# Patient Record
Sex: FEMALE | Race: WHITE | NOT HISPANIC OR LATINO | Employment: FULL TIME | URBAN - METROPOLITAN AREA
[De-identification: names, ages, dates, MRNs, and addresses within clinical notes are randomized per-mention and may not be internally consistent; named-entity substitution may affect disease eponyms.]

---

## 2019-07-08 ENCOUNTER — HISTORICAL (OUTPATIENT)
Dept: ADMINISTRATIVE | Facility: HOSPITAL | Age: 54
End: 2019-07-08

## 2019-07-19 ENCOUNTER — HISTORICAL (OUTPATIENT)
Dept: RADIOLOGY | Facility: HOSPITAL | Age: 54
End: 2019-07-19

## 2022-04-10 ENCOUNTER — HISTORICAL (OUTPATIENT)
Dept: ADMINISTRATIVE | Facility: HOSPITAL | Age: 57
End: 2022-04-10
Payer: COMMERCIAL

## 2022-04-27 VITALS
HEIGHT: 67 IN | SYSTOLIC BLOOD PRESSURE: 132 MMHG | DIASTOLIC BLOOD PRESSURE: 82 MMHG | WEIGHT: 227 LBS | BODY MASS INDEX: 35.63 KG/M2

## 2022-05-04 NOTE — HISTORICAL [facility] CERNER
This is a historical note converted from Kade. Formatting and pictures may have been removed.  Please reference Cerm[employer]am for original formatting and attached multimedia. Chief Complaint  Pt is here bilateral hip pain. Pt stated the pain has gotten worse.  History of Present Illness  Dilan is a 53-year-old female that presents office today for bilateral hip pain. ?This is been present for couple of years but?is worsened over the last couple months.? Her pain is localized to the lateral aspect of both hips. ?She denies any groin pain. ?She denies any back pain.? She states that this pain was noticeable after a total hysterectomy done in 2015.? She denies any?pain radiating down the leg or in the buttocks.? She has been seen by her PCP?diagnosed with some mild osteoarthritis?and osteopenia and sent to our office for further evaluation  Review of Systems  Systemic: No fever, no chills, and no recent weight change.  Head: No headache - frequent.  Eyes: No vision problems.  Otolarnygeal: No hearing loss, no earache, no epistaxis, no hoarseness, and no tooth pain. Gums normal.  Cardiovascular: No chest pain or discomfort and no palpitations.  Pulmonary: No pulmonary symptoms - no dyspnea, no shortness of breath  Gastrointestinal: Appetite not decreased. No dysphagia and no constant eructation. No nausea, no vomiting, no abdominal pain, no hematochezia.  Genitourinary: No genitourinary symptoms - No urinary hesitancy. No urinary loss of control - no burning sensation during urination.  Musculoskeletal: No calf muscle cramps and no localized soft tissue swelling  Neurological: No fainting and no convulsions.  Psychological: no depression.  Skin: No rash.  Physical Exam  Vitals & Measurements  T:?36.2? ?C (Oral)? HR:?70(Peripheral)? BP:?132/82?  HT:?170?cm? WT:?102.96?kg? BMI:?35.63?  bilateral hip exam  no swelling, erythema or increased heat  Tenderness to palpation over?greater trochanteric bursa of both hips  Full active  and passive range of motion both hips and flexion, internal and external rotation  No pain elicited with internal or external rotation  Full range of motion of both knees  Sensation intact distally  Pedal pulses 2+  ?  Radiographs of both hips taken office today show no signs of osteoarticular abnormalities  Bone density results were reviewed from 2015 showing?osteopenia  Assessment/Plan  1.?Bursitis of both hips?M70.71  ?Recommend physical therapy program with home exercises.? Recommend repeat bone density test as her last exam was done in 2015.? We will call her back with her test results and she will follow-up with us as needed.  ?  2.?Osteopenia?M85.80  ?Bone density test  Ordered:  [facility] Dexa Bone Density Study, Routine, *Est. 07/08/19 3:00:00 CDT, Osteoporosis, None, Stretcher, Patient Has IV?, Patient on Oxygen?, Rad Type, Order for future visit, Osteopenia, Schedule this test, [facility], *Est. 07/08/19 3:00:00 CDT  ?  Referrals  PT/OT External Referral, 07/08/19 9:57:00 CDT, Bursitis of both hips  Osteopenia, Anit Grav Hip Abductor & Extensor Exc.  Aquatics for ROM & Strength  Isotonic hamstring & Closed Chain Quad  No Dry Needling  Therapeutic Excercise  Stretch and Strengthen, 3 X Week, Patie...   Problem List/Past Medical History  Ongoing  Bursitis of both hips  Obesity  Osteopenia  Historical  Frequent UTI  Reflux  Procedure/Surgical History  Jejunostomy Laparoscopic (.) (07/10/2015)  Laparoscopy Exploratory (.) (07/10/2015)  Other enterostomy (07/10/2015)  Other procedures for creation of esophagogastric sphincteric competence (07/10/2015)  Suture of laceration of esophagus (07/10/2015)  Thoracotomy (.) (07/10/2015)  Hernia repair  Hysterectomy  Tonsillectomy   Medications  calcium (as calcium citrate) 250 mg oral tablet, 250 mg= 1 tab(s), Oral, BID  cranberry oral capsule  Duexis 800 mg-26.6 mg oral tablet, 1 tab(s), Oral, TID  Hair, Skin, & Nails Gummies, Chewed, Daily  hydroCHLOROthiazide  12.5 mg oral capsule, 12.5 mg= 1 cap(s), Oral, Daily  lansoprazole 15 mg oral DR capsule, 15 mg= 1 cap(s), Oral, BID  Lopreeza 0.5mg, 1 tab(s), Oral, Daily,? ?Not taking  Nexium 40 mg oral delayed release cap ([facility] Substitution), 40 mg, Oral, Daily,? ?Not taking  Probiotic + Colostrum, Oral, Daily  VESIcare 5 mg oral tablet, 5 mg= 1 tab(s), Oral, Daily,? ?Not taking  Vitamin C 25 mg oral tablet, chewable, 25 mg= 1 tab(s), Chewed, Daily  Vitamin D3  Xanax 0.5 mg oral tablet, 0.5 mg= 1 tab(s), Oral, Daily, PRN  Allergies  penicillins  Social History  Abuse/Neglect  No, 07/08/2019  Alcohol - Denies Alcohol Use, 07/10/2015  Substance Use - Denies Substance Abuse, 07/10/2015  Tobacco - Denies Tobacco Use, 07/10/2015  Former smoker, quit more than 30 days ago, N/A, 07/08/2019  Health Maintenance  Health Maintenance  ???Pending?(in the next year)  ??? ??OverDue  ??? ? ? ?Alcohol Misuse Screening due??01/01/19??and every 1??year(s)  ??? ??Due?  ??? ? ? ?ADL Screening due??07/08/19??and every 1??year(s)  ??? ? ? ?Breast Cancer Screening due??07/08/19??and every?  ??? ? ? ?Colorectal Screening due??07/08/19??and every?  ??? ? ? ?Diabetes Screening due??07/08/19??and every?  ??? ? ? ?Lipid Screening due??07/08/19??and every?  ??? ? ? ?Tetanus Vaccine due??07/08/19??and every 10??year(s)  ??? ??Due In Future?  ??? ? ? ?Obesity Screening not due until??01/01/20??and every 1??year(s)  ??? ? ? ?Blood Pressure Screening not due until??07/07/20??and every 1??year(s)  ??? ? ? ?Body Mass Index Check not due until??07/07/20??and every 1??year(s)  ??? ? ? ?Depression Screening not due until??07/07/20??and every 1??year(s)  ???Satisfied?(in the past 1 year)  ??? ??Satisfied?  ??? ? ? ?Blood Pressure Screening on??07/08/19.??Satisfied by Hilda Ulloa LPN  ??? ? ? ?Body Mass Index Check on??07/08/19.??Satisfied by Hilda Ulloa LPN  ??? ? ? ?Depression Screening on??07/08/19.??Satisfied by Hilda Ulloa LPN  ??? ? ? ?Obesity Screening  on??07/08/19.??Satisfied by Hilda Ulloa LPN  ?

## 2022-07-06 ENCOUNTER — OFFICE VISIT (OUTPATIENT)
Dept: SURGICAL ONCOLOGY | Facility: CLINIC | Age: 57
End: 2022-07-06
Payer: COMMERCIAL

## 2022-07-06 VITALS
BODY MASS INDEX: 32.18 KG/M2 | SYSTOLIC BLOOD PRESSURE: 153 MMHG | DIASTOLIC BLOOD PRESSURE: 89 MMHG | HEIGHT: 67 IN | HEART RATE: 81 BPM | WEIGHT: 205 LBS

## 2022-07-06 DIAGNOSIS — D13.4 HEPATIC ADENOMA: Primary | ICD-10-CM

## 2022-07-06 PROCEDURE — 3077F PR MOST RECENT SYSTOLIC BLOOD PRESSURE >= 140 MM HG: ICD-10-PCS | Mod: CPTII,S$GLB,, | Performed by: SURGERY

## 2022-07-06 PROCEDURE — 99213 PR OFFICE/OUTPT VISIT, EST, LEVL III, 20-29 MIN: ICD-10-PCS | Mod: S$GLB,,, | Performed by: SURGERY

## 2022-07-06 PROCEDURE — 3008F BODY MASS INDEX DOCD: CPT | Mod: CPTII,S$GLB,, | Performed by: SURGERY

## 2022-07-06 PROCEDURE — 1159F PR MEDICATION LIST DOCUMENTED IN MEDICAL RECORD: ICD-10-PCS | Mod: CPTII,S$GLB,, | Performed by: SURGERY

## 2022-07-06 PROCEDURE — 3079F DIAST BP 80-89 MM HG: CPT | Mod: CPTII,S$GLB,, | Performed by: SURGERY

## 2022-07-06 PROCEDURE — 3077F SYST BP >= 140 MM HG: CPT | Mod: CPTII,S$GLB,, | Performed by: SURGERY

## 2022-07-06 PROCEDURE — 3008F PR BODY MASS INDEX (BMI) DOCUMENTED: ICD-10-PCS | Mod: CPTII,S$GLB,, | Performed by: SURGERY

## 2022-07-06 PROCEDURE — 99999 PR PBB SHADOW E&M-EST. PATIENT-LVL III: CPT | Mod: PBBFAC,,, | Performed by: SURGERY

## 2022-07-06 PROCEDURE — 99999 PR PBB SHADOW E&M-EST. PATIENT-LVL III: ICD-10-PCS | Mod: PBBFAC,,, | Performed by: SURGERY

## 2022-07-06 PROCEDURE — 3079F PR MOST RECENT DIASTOLIC BLOOD PRESSURE 80-89 MM HG: ICD-10-PCS | Mod: CPTII,S$GLB,, | Performed by: SURGERY

## 2022-07-06 PROCEDURE — 1159F MED LIST DOCD IN RCRD: CPT | Mod: CPTII,S$GLB,, | Performed by: SURGERY

## 2022-07-06 PROCEDURE — 99213 OFFICE O/P EST LOW 20 MIN: CPT | Mod: S$GLB,,, | Performed by: SURGERY

## 2022-07-06 RX ORDER — ALPRAZOLAM 0.5 MG/1
TABLET ORAL
COMMUNITY
Start: 2022-04-29

## 2022-07-06 RX ORDER — HYDROCHLOROTHIAZIDE 12.5 MG/1
TABLET ORAL
COMMUNITY
Start: 2022-06-22

## 2022-07-06 NOTE — PROGRESS NOTES
Chief complaint:  Follow-up hepatic adenoma     HPI:  56-year-old female presents for follow-up of hepatic adenoma.  She has no complaints today.  No abdominal pain, early satiety or weight loss.  She remains off of any and all hormone replacement, including supplements.  Surveillance MRI was performed and I personally interpreted the images and report.  There remains a stable 1 cm fat containing lesion in hepatic segment 5 of the arterial phase enhancement most consistent hepatocellular adenoma.  This is unchanged for several years now.  I discussed these results with her today.        Past Medical and Surgical History  Allergies :   Penicillins    [facility]  Medical :   She has a past medical history of Bursitis of both hips, Hepatic adenoma, and Osteopenia.    Surgical :   She has a past surgical history that includes Tonsillectomy; Hysterectomy; Hernia repair; Laparoscopic insertion of jejunostomy tube; Robot-assisted exploratory laparoscopy; and Thoracotomy.     Family History  Her family history is not on file.    Social History  She      Review of Systems   Constitutional: Negative for appetite change, chills, diaphoresis and fever.   HENT: Negative for congestion, drooling, ear discharge, ear pain and hearing loss.    Eyes: Negative for discharge.   Respiratory: Negative for apnea, cough, choking, chest tightness, shortness of breath and stridor.    Cardiovascular: Negative for chest pain, palpitations and leg swelling.   Gastrointestinal: Negative for abdominal distention, abdominal pain, anal bleeding, blood in stool, constipation, diarrhea, nausea, rectal pain and vomiting.   Endocrine: Negative for cold intolerance, heat intolerance and polyuria.   Genitourinary: Negative for difficulty urinating, dyspareunia, dysuria and hematuria.   Musculoskeletal: Negative for arthralgias, gait problem and joint swelling.   Skin: Negative for color change and rash.   Neurological: Negative for dizziness, tremors,  seizures, syncope, facial asymmetry, speech difficulty, light-headedness, numbness and headaches.   Hematological: Negative for adenopathy. Does not bruise/bleed easily.   Psychiatric/Behavioral: Negative for agitation and confusion.        Objective   Physical Exam  Vitals and nursing note reviewed.   Constitutional:       General: She is not in acute distress.     Appearance: Normal appearance. She is not ill-appearing, toxic-appearing or diaphoretic.   HENT:      Head: Normocephalic and atraumatic.      Right Ear: External ear normal.      Left Ear: External ear normal.      Nose: Nose normal. No congestion or rhinorrhea.      Mouth/Throat:      Mouth: Mucous membranes are moist.      Pharynx: Oropharynx is clear. No oropharyngeal exudate or posterior oropharyngeal erythema.   Eyes:      General: No scleral icterus.     Extraocular Movements: Extraocular movements intact.      Conjunctiva/sclera: Conjunctivae normal.      Pupils: Pupils are equal, round, and reactive to light.   Cardiovascular:      Rate and Rhythm: Normal rate and regular rhythm.      Pulses: Normal pulses.      Heart sounds: No murmur heard.    No friction rub. No gallop.   Pulmonary:      Effort: Pulmonary effort is normal. No respiratory distress.      Breath sounds: Normal breath sounds. No stridor.   Abdominal:      General: Abdomen is flat. There is no distension.      Palpations: Abdomen is soft. There is no mass.      Tenderness: There is no abdominal tenderness. There is no right CVA tenderness, left CVA tenderness, guarding or rebound.      Hernia: No hernia is present.   Musculoskeletal:         General: No swelling, tenderness, deformity or signs of injury.      Cervical back: Normal range of motion and neck supple. No rigidity or tenderness.      Right lower leg: No edema.      Left lower leg: No edema.   Lymphadenopathy:      Cervical: No cervical adenopathy.   Skin:     General: Skin is warm.      Capillary Refill: Capillary  "refill takes less than 2 seconds.      Coloration: Skin is not jaundiced or pale.      Findings: No bruising, erythema, lesion or rash.   Neurological:      General: No focal deficit present.      Mental Status: She is alert and oriented to person, place, and time. Mental status is at baseline.      Cranial Nerves: No cranial nerve deficit.      Sensory: No sensory deficit.      Motor: No weakness.      Coordination: Coordination normal.      Gait: Gait normal.   Psychiatric:         Mood and Affect: Mood normal.         Behavior: Behavior normal.         Thought Content: Thought content normal.         Judgment: Judgment normal.       VITAL SIGNS: 24 HR MIN & MAX LAST    @FLOWSTAT(6:24::1)@           @FLOWSTAT(5:24::1)@  (!) 153/89     @FLOWSTAT(8:24::1)@  81     @FLOWSTAT(9:24::1)@       @FLOWSTAT(10:24::1)@         HT: 5' 7" (170.2 cm)  WT: 93 kg (205 lb)  BMI: 32.1       Assessment & Plan     Hepatic adenoma, segment 5 of the liver, 1 cm  Stable in size over several years, we again discussed guidelines the management and surgical intervention is not indicated at this time.  The recommendation is for continued surveillance due to risk of malignant transformation in a small percentage of patients.    Return to clinic in 1 year with repeat MRI with liver protocol    Christoph Jones MD  Surgical Oncology  Complex General, Gastrointestinal and Hepatobiliary Surgery    "

## 2022-07-07 DIAGNOSIS — D13.4 HEPATIC ADENOMA: Primary | ICD-10-CM

## 2023-07-11 ENCOUNTER — OFFICE VISIT (OUTPATIENT)
Dept: SURGICAL ONCOLOGY | Facility: CLINIC | Age: 58
End: 2023-07-11
Payer: COMMERCIAL

## 2023-07-11 VITALS
HEIGHT: 67 IN | WEIGHT: 219.63 LBS | SYSTOLIC BLOOD PRESSURE: 130 MMHG | HEART RATE: 60 BPM | DIASTOLIC BLOOD PRESSURE: 86 MMHG | BODY MASS INDEX: 34.47 KG/M2

## 2023-07-11 DIAGNOSIS — D13.4 HEPATIC ADENOMA: Primary | ICD-10-CM

## 2023-07-11 PROCEDURE — 99999 PR PBB SHADOW E&M-EST. PATIENT-LVL III: CPT | Mod: PBBFAC,,, | Performed by: SURGERY

## 2023-07-11 PROCEDURE — 3075F SYST BP GE 130 - 139MM HG: CPT | Mod: CPTII,S$GLB,, | Performed by: SURGERY

## 2023-07-11 PROCEDURE — 99213 PR OFFICE/OUTPT VISIT, EST, LEVL III, 20-29 MIN: ICD-10-PCS | Mod: S$GLB,,, | Performed by: SURGERY

## 2023-07-11 PROCEDURE — 3079F DIAST BP 80-89 MM HG: CPT | Mod: CPTII,S$GLB,, | Performed by: SURGERY

## 2023-07-11 PROCEDURE — 3008F BODY MASS INDEX DOCD: CPT | Mod: CPTII,S$GLB,, | Performed by: SURGERY

## 2023-07-11 PROCEDURE — 3075F PR MOST RECENT SYSTOLIC BLOOD PRESS GE 130-139MM HG: ICD-10-PCS | Mod: CPTII,S$GLB,, | Performed by: SURGERY

## 2023-07-11 PROCEDURE — 3008F PR BODY MASS INDEX (BMI) DOCUMENTED: ICD-10-PCS | Mod: CPTII,S$GLB,, | Performed by: SURGERY

## 2023-07-11 PROCEDURE — 99213 OFFICE O/P EST LOW 20 MIN: CPT | Mod: S$GLB,,, | Performed by: SURGERY

## 2023-07-11 PROCEDURE — 99999 PR PBB SHADOW E&M-EST. PATIENT-LVL III: ICD-10-PCS | Mod: PBBFAC,,, | Performed by: SURGERY

## 2023-07-11 PROCEDURE — 3079F PR MOST RECENT DIASTOLIC BLOOD PRESSURE 80-89 MM HG: ICD-10-PCS | Mod: CPTII,S$GLB,, | Performed by: SURGERY

## 2023-07-11 NOTE — PROGRESS NOTES
Chief complaint:  Follow-up hepatic adenoma     HPI:  57-year-old female presents for follow-up of hepatic adenoma.  She has no complaints today.  No abdominal pain, early satiety or weight loss.  She remains off of any and all hormone replacement, including supplements.  Surveillance MRI was performed and I personally interpreted the images and report.  There remains a stable 1 cm fat containing lesion in hepatic segment 5 of the arterial phase enhancement most consistent hepatocellular adenoma.  This is unchanged for several years now.  I discussed these results with her today.        Past Medical and Surgical History  Allergies :   Penicillins    [facility]  Medical :   She has a past medical history of Bursitis of both hips, Hepatic adenoma, and Osteopenia.    Surgical :   She has a past surgical history that includes Tonsillectomy; Hysterectomy; Hernia repair; Laparoscopic insertion of jejunostomy tube; Robot-assisted exploratory laparoscopy; and Thoracotomy.     Family History  Her family history is not on file.    Social History  She reports that she has never smoked. She has never been exposed to tobacco smoke. She has never used smokeless tobacco. She reports current alcohol use. She reports that she does not use drugs.     Review of Systems   Constitutional:  Negative for appetite change, chills, diaphoresis and fever.   HENT:  Negative for congestion, drooling, ear discharge, ear pain and hearing loss.    Eyes:  Negative for discharge.   Respiratory:  Negative for apnea, cough, choking, chest tightness, shortness of breath and stridor.    Cardiovascular:  Negative for chest pain, palpitations and leg swelling.   Gastrointestinal:  Negative for abdominal distention, abdominal pain, anal bleeding, blood in stool, constipation, diarrhea, nausea, rectal pain and vomiting.   Endocrine: Negative for cold intolerance, heat intolerance and polyuria.   Genitourinary:  Negative for difficulty urinating,  dyspareunia, dysuria and hematuria.   Musculoskeletal:  Negative for arthralgias, gait problem and joint swelling.   Skin:  Negative for color change and rash.   Neurological:  Negative for dizziness, tremors, seizures, syncope, facial asymmetry, speech difficulty, light-headedness, numbness and headaches.   Hematological:  Negative for adenopathy. Does not bruise/bleed easily.   Psychiatric/Behavioral:  Negative for agitation and confusion.       Objective   Physical Exam  Vitals and nursing note reviewed.   Constitutional:       General: She is not in acute distress.     Appearance: Normal appearance. She is not ill-appearing, toxic-appearing or diaphoretic.   HENT:      Head: Normocephalic and atraumatic.      Right Ear: External ear normal.      Left Ear: External ear normal.      Nose: Nose normal. No congestion or rhinorrhea.      Mouth/Throat:      Mouth: Mucous membranes are moist.      Pharynx: Oropharynx is clear. No oropharyngeal exudate or posterior oropharyngeal erythema.   Eyes:      General: No scleral icterus.     Extraocular Movements: Extraocular movements intact.      Conjunctiva/sclera: Conjunctivae normal.      Pupils: Pupils are equal, round, and reactive to light.   Cardiovascular:      Rate and Rhythm: Normal rate and regular rhythm.      Pulses: Normal pulses.      Heart sounds: No murmur heard.    No friction rub. No gallop.   Pulmonary:      Effort: Pulmonary effort is normal. No respiratory distress.      Breath sounds: Normal breath sounds. No stridor.   Abdominal:      General: Abdomen is flat. There is no distension.      Palpations: Abdomen is soft. There is no mass.      Tenderness: There is no abdominal tenderness. There is no right CVA tenderness, left CVA tenderness, guarding or rebound.      Hernia: No hernia is present.   Musculoskeletal:         General: No swelling, tenderness, deformity or signs of injury.      Cervical back: Normal range of motion and neck supple. No  "rigidity or tenderness.      Right lower leg: No edema.      Left lower leg: No edema.   Lymphadenopathy:      Cervical: No cervical adenopathy.   Skin:     General: Skin is warm.      Capillary Refill: Capillary refill takes less than 2 seconds.      Coloration: Skin is not jaundiced or pale.      Findings: No bruising, erythema, lesion or rash.   Neurological:      General: No focal deficit present.      Mental Status: She is alert and oriented to person, place, and time. Mental status is at baseline.      Cranial Nerves: No cranial nerve deficit.      Sensory: No sensory deficit.      Motor: No weakness.      Coordination: Coordination normal.      Gait: Gait normal.   Psychiatric:         Mood and Affect: Mood normal.         Behavior: Behavior normal.         Thought Content: Thought content normal.         Judgment: Judgment normal.     VITAL SIGNS: 24 HR MIN & MAX LAST    @FLOWSTAT(6:24::1)@           @FLOWSTAT(5:24::1)@  130/86     @FLOWSTAT(8:24::1)@  60     @FLOWSTAT(9:24::1)@       @FLOWSTAT(10:24::1)@         HT: 5' 7" (170.2 cm)  WT: 99.6 kg (219 lb 9.6 oz)  BMI: 34.4       Assessment & Plan     Hepatic adenoma, segment 5 of the liver, 1 cm  Stable in size over several years, we again discussed guidelines the management and surgical intervention is not indicated at this time.  The recommendation is for continued surveillance due to risk of malignant transformation in a small percentage of patients.    Return to clinic in 1 year with repeat MRI with liver protocol    Christoph Jones MD  Surgical Oncology  Complex General, Gastrointestinal and Hepatobiliary Surgery    "

## 2024-07-16 ENCOUNTER — OFFICE VISIT (OUTPATIENT)
Dept: SURGICAL ONCOLOGY | Facility: CLINIC | Age: 59
End: 2024-07-16
Payer: COMMERCIAL

## 2024-07-16 VITALS
WEIGHT: 219 LBS | TEMPERATURE: 98 F | SYSTOLIC BLOOD PRESSURE: 143 MMHG | BODY MASS INDEX: 34.37 KG/M2 | HEART RATE: 64 BPM | DIASTOLIC BLOOD PRESSURE: 83 MMHG | HEIGHT: 67 IN

## 2024-07-16 DIAGNOSIS — D13.4 HEPATIC ADENOMA: Primary | ICD-10-CM

## 2024-07-16 DIAGNOSIS — K43.2 INCISIONAL HERNIA, WITHOUT OBSTRUCTION OR GANGRENE: ICD-10-CM

## 2024-07-16 PROCEDURE — 99999 PR PBB SHADOW E&M-EST. PATIENT-LVL III: CPT | Mod: PBBFAC,,, | Performed by: SURGERY

## 2024-07-16 PROCEDURE — 99214 OFFICE O/P EST MOD 30 MIN: CPT | Mod: S$GLB,,, | Performed by: SURGERY

## 2024-07-16 PROCEDURE — G2211 COMPLEX E/M VISIT ADD ON: HCPCS | Mod: S$GLB,,, | Performed by: SURGERY

## 2024-07-16 PROCEDURE — 3079F DIAST BP 80-89 MM HG: CPT | Mod: CPTII,S$GLB,, | Performed by: SURGERY

## 2024-07-16 PROCEDURE — 3077F SYST BP >= 140 MM HG: CPT | Mod: CPTII,S$GLB,, | Performed by: SURGERY

## 2024-07-16 PROCEDURE — 1159F MED LIST DOCD IN RCRD: CPT | Mod: CPTII,S$GLB,, | Performed by: SURGERY

## 2024-07-16 PROCEDURE — 3008F BODY MASS INDEX DOCD: CPT | Mod: CPTII,S$GLB,, | Performed by: SURGERY

## 2024-07-16 RX ORDER — CALC/MAG/B COMPLEX/D3/HERB 61
15 TABLET ORAL 2 TIMES DAILY
COMMUNITY
Start: 2024-07-02

## 2024-07-16 NOTE — PROGRESS NOTES
Chief complaint:  Follow-up hepatic adenoma     HPI:  58-year-old female presents for follow-up of hepatic adenoma.  She has no complaints today.  No abdominal pain, early satiety or weight loss.  She remains off of any and all hormone replacement, including supplements.  Surveillance MRI was performed and I personally interpreted the images and report.  There remains a stable 1 cm fat containing lesion in hepatic segment 5 of the arterial phase enhancement most consistent hepatocellular adenoma.  This is unchanged for several years now.  I discussed these results with her today.    She also has a small incisional hernia which she reports has not changed in size, very minimal symptoms with straining.  No nausea vomiting constipation or obstructive symptoms.  The hernia appears unchanged on MRI    32 minutes was required for complete chart review, imaging review, medical decision making,  patient consultation, and documentation    Visit today included increased complexity associated with the care of the episodic problem hepatic adenoma addressed and managing the longitudinal care of the patient due to the serious and/or complex managed problem(s) .    Past Medical and Surgical History  Allergies :   Penicillins    [facility]  Medical :   She has a past medical history of Bursitis of both hips, Hepatic adenoma, and Osteopenia.    Surgical :   She has a past surgical history that includes Tonsillectomy; Hysterectomy; Hernia repair; Laparoscopic insertion of jejunostomy tube; Robot-assisted exploratory laparoscopy; and Thoracotomy.     Family History  Her family history is not on file.    Social History  She reports that she has never smoked. She has never been exposed to tobacco smoke. She has never used smokeless tobacco. She reports current alcohol use. She reports that she does not use drugs.     Review of Systems   Constitutional:  Negative for appetite change, chills, diaphoresis and fever.   HENT:  Negative for  congestion, drooling, ear discharge, ear pain and hearing loss.    Eyes:  Negative for discharge.   Respiratory:  Negative for apnea, cough, choking, chest tightness, shortness of breath and stridor.    Cardiovascular:  Negative for chest pain, palpitations and leg swelling.   Gastrointestinal:  Negative for abdominal distention, abdominal pain, anal bleeding, blood in stool, constipation, diarrhea, nausea, rectal pain and vomiting.   Endocrine: Negative for cold intolerance, heat intolerance and polyuria.   Genitourinary:  Negative for difficulty urinating, dyspareunia, dysuria and hematuria.   Musculoskeletal:  Negative for arthralgias, gait problem and joint swelling.   Skin:  Negative for color change and rash.   Neurological:  Negative for dizziness, tremors, seizures, syncope, facial asymmetry, speech difficulty, light-headedness, numbness and headaches.   Hematological:  Negative for adenopathy. Does not bruise/bleed easily.   Psychiatric/Behavioral:  Negative for agitation and confusion.         Objective   Physical Exam  Vitals and nursing note reviewed.   Constitutional:       General: She is not in acute distress.     Appearance: Normal appearance. She is not ill-appearing, toxic-appearing or diaphoretic.   HENT:      Head: Normocephalic and atraumatic.      Right Ear: External ear normal.      Left Ear: External ear normal.      Nose: Nose normal. No congestion or rhinorrhea.      Mouth/Throat:      Mouth: Mucous membranes are moist.      Pharynx: Oropharynx is clear. No oropharyngeal exudate or posterior oropharyngeal erythema.   Eyes:      General: No scleral icterus.     Extraocular Movements: Extraocular movements intact.      Conjunctiva/sclera: Conjunctivae normal.      Pupils: Pupils are equal, round, and reactive to light.   Cardiovascular:      Rate and Rhythm: Normal rate and regular rhythm.      Pulses: Normal pulses.      Heart sounds: No murmur heard.     No friction rub. No gallop.  "  Pulmonary:      Effort: Pulmonary effort is normal. No respiratory distress.      Breath sounds: Normal breath sounds. No stridor.   Abdominal:      General: Abdomen is flat. There is no distension.      Palpations: Abdomen is soft. There is no mass.      Tenderness: There is no abdominal tenderness. There is no right CVA tenderness, left CVA tenderness, guarding or rebound.      Hernia: No hernia is present.   Musculoskeletal:         General: No swelling, tenderness, deformity or signs of injury.      Cervical back: Normal range of motion and neck supple. No rigidity or tenderness.      Right lower leg: No edema.      Left lower leg: No edema.   Lymphadenopathy:      Cervical: No cervical adenopathy.   Skin:     General: Skin is warm.      Capillary Refill: Capillary refill takes less than 2 seconds.      Coloration: Skin is not jaundiced or pale.      Findings: No bruising, erythema, lesion or rash.   Neurological:      General: No focal deficit present.      Mental Status: She is alert and oriented to person, place, and time. Mental status is at baseline.      Cranial Nerves: No cranial nerve deficit.      Sensory: No sensory deficit.      Motor: No weakness.      Coordination: Coordination normal.      Gait: Gait normal.   Psychiatric:         Mood and Affect: Mood normal.         Behavior: Behavior normal.         Thought Content: Thought content normal.         Judgment: Judgment normal.       VITAL SIGNS: 24 HR MIN & MAX LAST    @FLOWSTAT(6:24::1)@  98.1 °F (36.7 °C)        @FLOWSTAT(5:24::1)@  (!) 143/83     @FLOWSTAT(8:24::1)@  64     @FLOWSTAT(9:24::1)@       @FLOWSTAT(10:24::1)@         HT: 5' 7" (170.2 cm)  WT: 99.3 kg (219 lb)  BMI: 34.3       Assessment & Plan     Hepatic adenoma, segment 5 of the liver, 1 cm    Stable in size over several years, we again discussed guidelines the management and surgical intervention is not indicated at this time.  The recommendation is for continued surveillance due " to risk of malignant transformation in a small percentage of patients.    Small incisional hernia, no increase in size or symptoms.  Recommend continue observation, surgical intervention only if symptoms worsen or hernia enlarges significantly    Return to clinic in 1 year with repeat MRI with liver protocol    Christoph Jones MD  Surgical Oncology  Complex General, Gastrointestinal and Hepatobiliary Surgery

## 2025-06-16 ENCOUNTER — DOCUMENTATION ONLY (OUTPATIENT)
Dept: SURGICAL ONCOLOGY | Facility: CLINIC | Age: 60
End: 2025-06-16
Payer: COMMERCIAL

## 2025-06-16 NOTE — PROGRESS NOTES
Patient is scheduled for MRI at [facility] on 06/23/25 and she is also scheduled for an RTC to review that on 07/01/2025 w/ Dr. Christoph Jones. cpl

## 2025-07-01 ENCOUNTER — OFFICE VISIT (OUTPATIENT)
Dept: SURGICAL ONCOLOGY | Facility: CLINIC | Age: 60
End: 2025-07-01
Payer: COMMERCIAL

## 2025-07-01 VITALS
HEART RATE: 60 BPM | WEIGHT: 211.19 LBS | RESPIRATION RATE: 18 BRPM | BODY MASS INDEX: 33.15 KG/M2 | HEIGHT: 67 IN | TEMPERATURE: 98 F | DIASTOLIC BLOOD PRESSURE: 76 MMHG | SYSTOLIC BLOOD PRESSURE: 138 MMHG | OXYGEN SATURATION: 97 %

## 2025-07-01 DIAGNOSIS — K43.2 INCISIONAL HERNIA, WITHOUT OBSTRUCTION OR GANGRENE: ICD-10-CM

## 2025-07-01 DIAGNOSIS — D13.4 HEPATIC ADENOMA: Primary | ICD-10-CM

## 2025-07-01 PROCEDURE — 99999 PR PBB SHADOW E&M-EST. PATIENT-LVL IV: CPT | Mod: PBBFAC,,, | Performed by: NURSE PRACTITIONER

## 2025-07-01 PROCEDURE — 3075F SYST BP GE 130 - 139MM HG: CPT | Mod: CPTII,S$GLB,, | Performed by: NURSE PRACTITIONER

## 2025-07-01 PROCEDURE — 3078F DIAST BP <80 MM HG: CPT | Mod: CPTII,S$GLB,, | Performed by: NURSE PRACTITIONER

## 2025-07-01 PROCEDURE — 3008F BODY MASS INDEX DOCD: CPT | Mod: CPTII,S$GLB,, | Performed by: NURSE PRACTITIONER

## 2025-07-01 PROCEDURE — 1159F MED LIST DOCD IN RCRD: CPT | Mod: CPTII,S$GLB,, | Performed by: NURSE PRACTITIONER

## 2025-07-01 PROCEDURE — 1160F RVW MEDS BY RX/DR IN RCRD: CPT | Mod: CPTII,S$GLB,, | Performed by: NURSE PRACTITIONER

## 2025-07-01 PROCEDURE — 99213 OFFICE O/P EST LOW 20 MIN: CPT | Mod: S$GLB,,, | Performed by: NURSE PRACTITIONER

## 2025-07-01 NOTE — PROGRESS NOTES
[facility]    Patient ID: [id number]     Chief Complaint: Follow-up (1 YR F/U W/ MRI ABD @ [facility] ON 6/23/2025)      HPI:     Dilan Perea is a 59 y.o. female here for follow up regarding hepatic adenoma. MRI abdomen shows stable hepatic lesion in segment V along with incidental finding of incisional hernia. Patient denies abdominal pain with exception of lower abdominal spasms gastroenterology is related to possible IBS.     Past Medical History:   Diagnosis Date    Bursitis of both hips     Hepatic adenoma     Osteopenia         Past Surgical History:   Procedure Laterality Date    CHOLECYSTECTOMY      HERNIA REPAIR      HYSTERECTOMY      LAPAROSCOPIC INSERTION OF JEJUNOSTOMY TUBE      ROBOT-ASSISTED EXPLORATORY LAPAROSCOPY      THORACOTOMY      TONSILLECTOMY      TUBAL LIGATION          Social History     Tobacco Use    Smoking status: Never     Passive exposure: Never    Smokeless tobacco: Never   Substance and Sexual Activity    Alcohol use: Not Currently    Drug use: Never    Sexual activity: Yes     Partners: Male        Current Outpatient Medications   Medication Instructions    ALPRAZolam (XANAX) 0.5 MG tablet No dose, route, or frequency recorded.    hydroCHLOROthiazide (HYDRODIURIL) 12.5 MG Tab No dose, route, or frequency recorded.    lansoprazole (PREVACID) 15 mg, 2 times daily       Review of patient's allergies indicates:   Allergen Reactions    Penicillins         Patient Care Team:  Jaswant Lopez MD as PCP - General (Internal Medicine)     Subjective:     Review of Systems   Constitutional:  Negative for chills and fever.   HENT: Negative.     Eyes:  Negative for visual disturbance.   Respiratory:  Negative for chest tightness and shortness of breath.    Cardiovascular:  Negative for chest pain.   Gastrointestinal: Negative.  Negative for nausea and vomiting.   Genitourinary:  Negative for difficulty urinating and hematuria.   Musculoskeletal: Negative.    Skin:  Negative  "for rash and wound.   Neurological: Negative.    Psychiatric/Behavioral: Negative.         12 point review of systems conducted, negative except as stated in the history of present illness. See HPI for details.    Objective:     Visit Vitals  /76   Pulse 60   Temp 97.7 °F (36.5 °C) (Oral)   Resp 18   Ht 5' 7" (1.702 m)   Wt 95.8 kg (211 lb 3.2 oz)   LMP  (LMP Unknown)   SpO2 97%   BMI 33.08 kg/m²       Physical Exam  Constitutional:       General: She is not in acute distress.  HENT:      Head: Atraumatic.      Nose: Nose normal.      Mouth/Throat:      Mouth: Mucous membranes are moist.   Eyes:      General: No scleral icterus.     Extraocular Movements: Extraocular movements intact.      Conjunctiva/sclera: Conjunctivae normal.   Cardiovascular:      Rate and Rhythm: Normal rate and regular rhythm.      Pulses: Normal pulses.      Heart sounds: Normal heart sounds.   Pulmonary:      Effort: Pulmonary effort is normal. No respiratory distress.      Breath sounds: Normal breath sounds.   Abdominal:      General: Bowel sounds are normal. There is no distension.      Palpations: Abdomen is soft. There is no mass.      Tenderness: There is no abdominal tenderness. There is no guarding.      Hernia: A hernia is present.      Comments: Reducible incisional hernia   Musculoskeletal:         General: No swelling.      Cervical back: Normal range of motion and neck supple. No rigidity.   Skin:     General: Skin is warm and dry.   Neurological:      General: No focal deficit present.      Mental Status: She is alert and oriented to person, place, and time.   Psychiatric:         Mood and Affect: Mood normal.         Behavior: Behavior normal.         Assessment/Plan:     Hepatic adenoma stable from current and prior imaging   Incisional hernia, asymptomatic without evidence of obstruction or gangrene       Repeat MRI abdomen with liver protocol and RTC in one year     RYLEE Donis  General Surgery   Surgical " Oncology